# Patient Record
Sex: MALE | URBAN - METROPOLITAN AREA
[De-identification: names, ages, dates, MRNs, and addresses within clinical notes are randomized per-mention and may not be internally consistent; named-entity substitution may affect disease eponyms.]

---

## 2017-12-07 RX ORDER — IBUPROFEN AND FAMOTIDINE 26.6; 8 MG/1; MG/1
1 TABLET, FILM COATED ORAL DAILY
COMMUNITY

## 2017-12-07 RX ORDER — CEFAZOLIN SODIUM 2 G/50ML
2 SOLUTION INTRAVENOUS ONCE
Status: CANCELLED | OUTPATIENT
Start: 2017-12-07 | End: 2017-12-07

## 2017-12-07 RX ORDER — METHOCARBAMOL 750 MG/1
750 TABLET, FILM COATED ORAL
COMMUNITY

## 2017-12-07 NOTE — PROGRESS NOTES
Pt aware of light bkfst of tea and toast.  PT aware of need to hold anticoagulants per protocol. Instructed pt to stop duexis 3 days prior to procedure. PT aware of need for  at discharge. Pt aware to hold pain medication 8 hrs prior to procedure. PT aware of arrival time pre procedure. Arrive at [facility] pt registration on 12/29/17 at 0800 for scheduled procedure to occur at 0900. Pt states no questions at this time. Gave pt [facility] rad rn phone number [phone].

## 2017-12-29 ENCOUNTER — HOSPITAL ENCOUNTER (OUTPATIENT)
Dept: INTERVENTIONAL RADIOLOGY/VASCULAR | Age: 42
Discharge: HOME | End: 2017-12-29
Attending: PAIN MEDICINE